# Patient Record
Sex: FEMALE | Race: AMERICAN INDIAN OR ALASKA NATIVE
[De-identification: names, ages, dates, MRNs, and addresses within clinical notes are randomized per-mention and may not be internally consistent; named-entity substitution may affect disease eponyms.]

---

## 2018-08-08 ENCOUNTER — HOSPITAL ENCOUNTER (EMERGENCY)
Dept: HOSPITAL 5 - ED | Age: 28
Discharge: HOME | End: 2018-08-08
Payer: SELF-PAY

## 2018-08-08 VITALS — SYSTOLIC BLOOD PRESSURE: 130 MMHG | DIASTOLIC BLOOD PRESSURE: 73 MMHG

## 2018-08-08 DIAGNOSIS — G43.919: Primary | ICD-10-CM

## 2018-08-08 PROCEDURE — 96374 THER/PROPH/DIAG INJ IV PUSH: CPT

## 2018-08-08 PROCEDURE — 96375 TX/PRO/DX INJ NEW DRUG ADDON: CPT

## 2018-08-08 PROCEDURE — 99282 EMERGENCY DEPT VISIT SF MDM: CPT

## 2018-08-08 NOTE — EMERGENCY DEPARTMENT REPORT
ED Headache HPI





- General


Chief Complaint: Headache


Stated Complaint: MIGRAINES


Time Seen by Provider: 08/08/18 23:09





- History of Present Illness


Initial Comments: 





28-year-old  female comes in complaining of migraine has been 

off and on for the past 3 days.  She reports pain is temporarily relieved by 

over-the-counter medication but then will return.  She admits to nausea and 

photosensitivity.  She reports that she just took 2 Aleve approximately 7:30 

prior to arrival.  Patient denies any trauma


Timing/Duration: waxing and waning (3 days)


Recent Head Trauma: no recent headache/trauma


Modifying Factors: improves with: exposure to light


Associated Symptoms: nausea/vomiting (no vomiting).  denies: fatigue, facial 

pain, fever/chills, nasal congestion, nasal drainage, seizures, sinus infection

, vision changes


Allergies/Adverse Reactions: 


Allergies





No Known Allergies Allergy (Unverified 10/29/14 23:50)


 








Home Medications: 


Ambulatory Orders





Acetaminophen/Codeine [Acetaminophen-Codeine #3 TAB] 1 tab PO Q6H PRN #14 tab 10

/30/14 


Penicillin Vk [Veetids TAB] 500 mg PO QID 7 Days  tablet 10/30/14 


Promethazine [Phenergan] 25 mg PO Q6H PRN #14 tablet 10/30/14 


Aspirin/Acetaminophen/Caffeine [Excedrin Migraine Caplet] 1 each PO Q8H #30 

tablet 08/08/18 











ED Review of Systems


ROS: 


Stated complaint: MIGRAINES


Other details as noted in HPI





Comment: All other systems reviewed and negative


Gastrointestinal: nausea


Neurological: headache





ED Past Medical Hx





- Past Medical History


Previous Medical History?: No





- Surgical History


Past Surgical History?: Yes


Additional Surgical History: Hernia Repair





- Social History


Smoking Status: Never Smoker


Substance Use Type: None





- Medications


Home Medications: 


 Home Medications











 Medication  Instructions  Recorded  Confirmed  Last Taken  Type


 


Acetaminophen/Codeine 1 tab PO Q6H PRN #14 tab 10/30/14  Unknown Rx





[Acetaminophen-Codeine #3 TAB]     


 


Penicillin Vk [Veetids TAB] 500 mg PO QID 7 Days  tablet 10/30/14  Unknown Rx


 


Promethazine [Phenergan] 25 mg PO Q6H PRN #14 tablet 10/30/14  Unknown Rx


 


Aspirin/Acetaminophen/Caffeine 1 each PO Q8H #30 tablet 08/08/18  Unknown Rx





[Excedrin Migraine Caplet]     














ED Physical Exam





- General


Limitations: No Limitations


General appearance: alert, in no apparent distress





- Head


Head exam: Present: atraumatic, normocephalic





- Eye


Eye exam: Present: EOMI





- Neck


Neck exam: Present: normal inspection





- Expanded Neurological Exam


  ** Expanded


Speech: Present: fluid speech


Cranial nerves: EOM's Intact: Normal, Gag Reflex: Normal, Tongue Deviation: 

Normal, Nystagmus: Normal, Facial Sensation: Normal, Facial Palsy with Forehead 

Movement: Normal, Facial Palsy without Forehead Movement: Normal


Cerebellar function: Finger to Nose: Normal, Heel to Shin: Normal


Upper motor neuron: Ion Neglect: Normal, Pronator Drift: Normal


Sensory exam: Upper Extremity Light Touch: Normal, Upper Extremity Pin Prick: 

Normal, Upper Extremity Temperature: Normal, UE 2 Point Discrimination: Normal, 

Lower Extremity Light Touch: Normal, Lower Extremity Pin Prick: Normal


Motor strength exam: RUE: 5, LUE: 5, RLE: 5, LLE: 5


Best Eye Response (Zeyad): (4) open spontaneously


Best Motor Response (Zeyad): (6) obeys commands


Best Verbal Response (Zeyad): (5) oriented


Zeyad Total: 15





- Psychiatric


Psychiatric exam: Present: normal affect





- Skin


Skin exam: Present: warm, dry, intact, normal color.  Absent: rash





ED Course





 Vital Signs











  08/08/18 08/08/18





  19:39 20:05


 


Temperature 98.8 F 98.8 F


 


Pulse Rate 84 88


 


Respiratory 18 18





Rate  


 


Blood Pressure 130/73 130/73


 


O2 Sat by Pulse 100 100





Oximetry  














- Reevaluation(s)


Reevaluation #1: 





08/08/18 23:14


After migraine cocktail/protocol patient headache has resolved.





ED Medical Decision Making





- Medical Decision Making





Patient has been evaluated by this provider fast track.


IV placed and was given Toradol, Reglan, Benadryl.


Patient reports that her headache is gone.  Patient denies any nausea at this 

time


Critical care attestation.: 


If time is entered above; I have spent that time in minutes in the direct care 

of this critically ill patient, excluding procedure time.








ED Disposition


Clinical Impression: 


Migraine


Qualifiers:


 Migraine type: unspecified Status migrainosus presence: without status 

migrainosus Intractability: intractable Qualified Code(s): G43.919 - Migraine, 

unspecified, intractable, without status migrainosus





Disposition: DC-01 TO HOME OR SELFCARE


Is pt being admited?: No


Does the pt Need Aspirin: No


Condition: Stable


Instructions:  Migraine Headache (ED)


Additional Instructions: 


Take medication as prescribed.  If her headaches persist or gets worse please 

follow up with her primary care provider.


Prescriptions: 


Aspirin/Acetaminophen/Caffeine [Excedrin Migraine Caplet] 1 each PO Q8H #30 

tablet


Referrals: 


PRIMARY CARE,MD [Primary Care Provider] - 3-5 Days


[facility] [Provider Group] - 3-5 Days


Forms:  Work/School Release Form(ED)

## 2018-08-26 ENCOUNTER — HOSPITAL ENCOUNTER (EMERGENCY)
Dept: HOSPITAL 5 - ED | Age: 28
Discharge: HOME | End: 2018-08-26
Payer: SELF-PAY

## 2018-08-26 VITALS — SYSTOLIC BLOOD PRESSURE: 134 MMHG | DIASTOLIC BLOOD PRESSURE: 83 MMHG

## 2018-08-26 DIAGNOSIS — K02.9: Primary | ICD-10-CM

## 2018-08-26 DIAGNOSIS — K05.10: ICD-10-CM

## 2018-08-26 PROCEDURE — 99282 EMERGENCY DEPT VISIT SF MDM: CPT

## 2018-08-26 NOTE — EMERGENCY DEPARTMENT REPORT
ED ENT HPI





- General


Chief complaint: Dental/Oral


Stated complaint: RT SIDE TOOTH PAIN


Time Seen by Provider: 08/26/18 17:21


Source: patient


Mode of arrival: Ambulatory


Limitations: No Limitations





- History of Present Illness


Initial comments: 





This is a 28-year-old female nontoxic, well nourished in appearance, no acute 

signs of distress presents to the ED with c/o of left lower toothache 3 weeks.

  Patient denies following up with a dentist.  Patient stated that pain 

radiates from his job to his left side of head.  Patient otherwise denies any 

head trauma.  Patient describes toothache as aching level of 8 out of 10.  

Patient denies any facial swelling.  Patient denies any numbness, tingling, 

fever, chills, headache, stiff neck, abdominal pain, chest pain, shortness of 

breath.  Patient denies any drug allergies or significant past medical history.

  


MD complaint: tooth pain


-: week(s) (3)


Location: tooth #





  __________________________














  __________________________





 1 - pain here





Severity: mild


Severity scale (0 -10): 8


Quality: aching


Consistency: constant


Improves with: none


Worsens with: none


Associated Symptoms: gum swelling, toothache.  denies: fever, cough, pain with 

swallowing, sore throat, tinnitus, hearing loss, discharge from ear, rhinorrhea





- Related Data


 Previous Rx's











 Medication  Instructions  Recorded  Last Taken  Type


 


Acetaminophen/Codeine 1 tab PO Q6H PRN #14 tab 10/30/14 Unknown Rx





[Acetaminophen-Codeine #3 TAB]    


 


Penicillin Vk [Veetids TAB] 500 mg PO QID 7 Days  tablet 10/30/14 Unknown Rx


 


Promethazine [Phenergan] 25 mg PO Q6H PRN #14 tablet 10/30/14 Unknown Rx


 


Aspirin/Acetaminophen/Caffeine 1 each PO Q8H #30 tablet 08/08/18 Unknown Rx





[Excedrin Migraine Caplet]    


 


Acetaminophen/Codeine [Tylenol 1 tab PO Q6H PRN #12 tab 08/26/18 Unknown Rx





/Codeine # 3 tab]    


 


Amoxicillin/K Clav Tab [Augmentin 1 tab PO Q12HR #20 tab 08/26/18 Unknown Rx





875 mg]    


 


Chlorhexidine Mouthwash [Peridex] 15 ml MM BID #1 bottle 08/26/18 Unknown Rx


 


Ibuprofen [Motrin] 600 mg PO Q8H PRN #30 tablet 08/26/18 Unknown Rx











 Allergies











Allergy/AdvReac Type Severity Reaction Status Date / Time


 


No Known Allergies Allergy   Unverified 10/29/14 23:50














ED Dental HPI





- General


Chief complaint: Dental/Oral


Stated complaint: RT SIDE TOOTH PAIN


Time Seen by Provider: 08/26/18 17:21


Source: patient


Mode of arrival: Ambulatory


Limitations: No Limitations





- Related Data


 Previous Rx's











 Medication  Instructions  Recorded  Last Taken  Type


 


Acetaminophen/Codeine 1 tab PO Q6H PRN #14 tab 10/30/14 Unknown Rx





[Acetaminophen-Codeine #3 TAB]    


 


Penicillin Vk [Veetids TAB] 500 mg PO QID 7 Days  tablet 10/30/14 Unknown Rx


 


Promethazine [Phenergan] 25 mg PO Q6H PRN #14 tablet 10/30/14 Unknown Rx


 


Aspirin/Acetaminophen/Caffeine 1 each PO Q8H #30 tablet 08/08/18 Unknown Rx





[Excedrin Migraine Caplet]    


 


Acetaminophen/Codeine [Tylenol 1 tab PO Q6H PRN #12 tab 08/26/18 Unknown Rx





/Codeine # 3 tab]    


 


Amoxicillin/K Clav Tab [Augmentin 1 tab PO Q12HR #20 tab 08/26/18 Unknown Rx





875 mg]    


 


Chlorhexidine Mouthwash [Peridex] 15 ml MM BID #1 bottle 08/26/18 Unknown Rx


 


Ibuprofen [Motrin] 600 mg PO Q8H PRN #30 tablet 08/26/18 Unknown Rx











 Allergies











Allergy/AdvReac Type Severity Reaction Status Date / Time


 


No Known Allergies Allergy   Unverified 10/29/14 23:50














ED Review of Systems


ROS: 


Stated complaint: RT SIDE TOOTH PAIN


Other details as noted in HPI





Constitutional: denies: chills, fever


Eyes: denies: eye pain, eye discharge, vision change


ENT: dental pain.  denies: ear pain, throat pain


Respiratory: denies: cough, shortness of breath, wheezing


Cardiovascular: denies: chest pain, palpitations


Endocrine: no symptoms reported


Gastrointestinal: denies: abdominal pain, nausea, diarrhea


Genitourinary: denies: urgency, dysuria, discharge


Musculoskeletal: denies: back pain, joint swelling, arthralgia


Skin: denies: rash, lesions


Neurological: denies: headache, weakness, paresthesias


Psychiatric: denies: anxiety, depression


Hematological/Lymphatic: denies: easy bleeding, easy bruising





ED Past Medical Hx





- Past Medical History


Previous Medical History?: No





- Surgical History


Additional Surgical History: Hernia Repair





- Social History


Smoking Status: Never Smoker


Substance Use Type: None





- Medications


Home Medications: 


 Home Medications











 Medication  Instructions  Recorded  Confirmed  Last Taken  Type


 


Acetaminophen/Codeine 1 tab PO Q6H PRN #14 tab 10/30/14  Unknown Rx





[Acetaminophen-Codeine #3 TAB]     


 


Penicillin Vk [Veetids TAB] 500 mg PO QID 7 Days  tablet 10/30/14  Unknown Rx


 


Promethazine [Phenergan] 25 mg PO Q6H PRN #14 tablet 10/30/14  Unknown Rx


 


Aspirin/Acetaminophen/Caffeine 1 each PO Q8H #30 tablet 08/08/18  Unknown Rx





[Excedrin Migraine Caplet]     


 


Acetaminophen/Codeine [Tylenol 1 tab PO Q6H PRN #12 tab 08/26/18  Unknown Rx





/Codeine # 3 tab]     


 


Amoxicillin/K Clav Tab [Augmentin 1 tab PO Q12HR #20 tab 08/26/18  Unknown Rx





875 mg]     


 


Chlorhexidine Mouthwash [Peridex] 15 ml MM BID #1 bottle 08/26/18  Unknown Rx


 


Ibuprofen [Motrin] 600 mg PO Q8H PRN #30 tablet 08/26/18  Unknown Rx














ED Physical Exam





- General


Limitations: No Limitations


General appearance: alert, in no apparent distress





- Head


Head exam: Present: atraumatic, normocephalic





- Eye


Eye exam: Present: normal appearance


Pupils: Present: normal accommodation





- ENT


ENT exam: Present: mucous membranes moist, TM's normal bilaterally, normal 

external ear exam





- Expanded ENT Exam


  ** Expanded


Ear exam: Present: normal external inspection


Mouth exam: Present: normal external inspection, tongue normal.  Absent: 

drooling, trismus, muffled voice, tongue elevation, laceration


Teeth exam: Present: dental caries, fractured tooth #, dental tenderness #, 

gingival enlargement, other (No facial swelling)


Throat exam: Positive: normal inspection, other (Uvula midline.).  Negative: 

tonsillar erythema, tonsillomegaly, tonsillar exudate, R peritonsillar mass, L 

peritonsillar mass





- Neck


Neck exam: Present: normal inspection, full ROM.  Absent: tenderness, 

meningismus, lymphadenopathy





- Respiratory


Respiratory exam: Present: normal lung sounds bilaterally.  Absent: respiratory 

distress





- Cardiovascular


Cardiovascular Exam: Present: regular rate, normal rhythm.  Absent: systolic 

murmur, diastolic murmur, rubs, gallop





- GI/Abdominal


GI/Abdominal exam: Present: soft, normal bowel sounds





- Extremities Exam


Extremities exam: Present: normal inspection





- Back Exam


Back exam: Present: normal inspection





- Neurological Exam


Neurological exam: Present: alert, oriented X3





- Psychiatric


Psychiatric exam: Present: normal affect, normal mood





- Skin


Skin exam: Present: warm, dry, intact, normal color.  Absent: rash





ED Course





 Vital Signs











  08/26/18





  15:14


 


Temperature 98.7 F


 


Pulse Rate 65


 


Respiratory 16





Rate 


 


Blood Pressure 134/83


 


O2 Sat by Pulse 100





Oximetry 














- Reevaluation(s)


Reevaluation #1: 





08/26/18 17:43


Patient is speaking in full sentences with no signs of distress noted.


Critical care attestation.: 


If time is entered above; I have spent that time in minutes in the direct care 

of this critically ill patient, excluding procedure time.








ED Disposition


Clinical Impression: 


 Dental caries, Gingivitis





Disposition: DC-01 TO HOME OR SELFCARE


Is pt being admited?: No


Does the pt Need Aspirin: No


Condition: Stable


Instructions:  Dental Caries (ED), Gingivitis (ED), Acetaminophen/Codeine (By 

mouth), Ibuprofen (By mouth), Amoxicillin/Clavulanate Potassium (By mouth)


Additional Instructions: 


Follow-up with a dentist doctor in 3-5 days or if symptoms worsen and continue 

return to emergency room as soon as possible. 


Do not operate any machinery while taking Tylenol with codeine as this may 

cause drowsiness.


Prescriptions: 


Acetaminophen/Codeine [Tylenol /Codeine # 3 tab] 1 tab PO Q6H PRN #12 tab


 PRN Reason: Pain , Severe (7-10)


Amoxicillin/K Clav Tab [Augmentin 875 mg] 1 tab PO Q12HR #20 tab


Chlorhexidine Mouthwash [Peridex] 15 ml MM BID #1 bottle


Ibuprofen [Motrin] 600 mg PO Q8H PRN #30 tablet


 PRN Reason: Pain


Referrals: 


PRIMARY CARE,MD [Primary Care Provider] - 3-5 Days


RUEL SHIELDS MD [Staff Physician] - 3-5 Days


[facility] [Outside] - 3-5 Days


Forms:  Work/School Release Form(ED)